# Patient Record
Sex: MALE | Race: WHITE
[De-identification: names, ages, dates, MRNs, and addresses within clinical notes are randomized per-mention and may not be internally consistent; named-entity substitution may affect disease eponyms.]

---

## 2022-09-14 ENCOUNTER — HOSPITAL ENCOUNTER (INPATIENT)
Dept: HOSPITAL 54 - MED | Age: 57
LOS: 2 days | Discharge: HOME | DRG: 279 | End: 2022-09-16
Attending: INTERNAL MEDICINE | Admitting: NURSE PRACTITIONER
Payer: COMMERCIAL

## 2022-09-14 VITALS — SYSTOLIC BLOOD PRESSURE: 146 MMHG | DIASTOLIC BLOOD PRESSURE: 86 MMHG

## 2022-09-14 VITALS — HEIGHT: 69 IN | BODY MASS INDEX: 26.51 KG/M2 | WEIGHT: 179 LBS

## 2022-09-14 DIAGNOSIS — K72.00: Primary | ICD-10-CM

## 2022-09-14 DIAGNOSIS — K71.3: ICD-10-CM

## 2022-09-14 DIAGNOSIS — Z86.73: ICD-10-CM

## 2022-09-14 DIAGNOSIS — D69.59: ICD-10-CM

## 2022-09-14 DIAGNOSIS — D64.9: ICD-10-CM

## 2022-09-14 DIAGNOSIS — K74.60: ICD-10-CM

## 2022-09-14 DIAGNOSIS — Z59.00: ICD-10-CM

## 2022-09-14 DIAGNOSIS — Z87.891: ICD-10-CM

## 2022-09-14 DIAGNOSIS — B19.20: ICD-10-CM

## 2022-09-14 DIAGNOSIS — F19.10: ICD-10-CM

## 2022-09-14 PROCEDURE — G0378 HOSPITAL OBSERVATION PER HR: HCPCS

## 2022-09-14 RX ADMIN — LACTULOSE SCH G: 10 SOLUTION ORAL at 23:48

## 2022-09-14 RX ADMIN — Medication SCH MG: at 23:48

## 2022-09-14 SDOH — ECONOMIC STABILITY - HOUSING INSECURITY: HOMELESSNESS UNSPECIFIED: Z59.00

## 2022-09-14 NOTE — NUR
MS RN ADMISSION NOTES:



RECEIVED PATIENT VIA RNEY DIRECT ADMIT TO [facility] AT 2130 AWAKE NO COMPLAIN OF 
PAIN AND DISCOMFORT NOTIFY DR KATELYNN MAGALLANES, PATIENT IS A/OX2-3 WITH EPISODE OF CONFUSION, IV 
LINE INSERTED AT RIGHT HAND #20SL. SKIN ASSESSMENT DONE DOCUMENTED, INVENTORY DONE, PATIENT 
WAS ORIENTED TO ROOM REMIND TO USE THE CALL LIGHTS WHEN NEEDED ASSISTANCE, PATIENT KEPT 
CLEAN AND DRY ALL NEEDS MET, WILL CONTINUE TO MONITOR.

## 2022-09-15 VITALS — DIASTOLIC BLOOD PRESSURE: 96 MMHG | SYSTOLIC BLOOD PRESSURE: 131 MMHG

## 2022-09-15 LAB
ALBUMIN SERPL BCP-MCNC: 2.9 G/DL (ref 3.4–5)
ALP SERPL-CCNC: 147 U/L (ref 46–116)
ALT SERPL W P-5'-P-CCNC: 32 U/L (ref 12–78)
AMMONIA PLAS-SCNC: 71 UMOL/L (ref 11–32)
AST SERPL W P-5'-P-CCNC: 55 U/L (ref 15–37)
BASOPHILS # BLD AUTO: 0.1 K/UL (ref 0–0.2)
BASOPHILS NFR BLD AUTO: 1.5 % (ref 0–2)
BASOPHILS NFR BLD MANUAL: 0 % (ref 0–2)
BILIRUB DIRECT SERPL-MCNC: 0.4 MG/DL (ref 0–0.2)
BILIRUB SERPL-MCNC: 1.7 MG/DL (ref 0.2–1)
BUN SERPL-MCNC: 15 MG/DL (ref 7–18)
CALCIUM SERPL-MCNC: 9.1 MG/DL (ref 8.5–10.1)
CHLORIDE SERPL-SCNC: 106 MMOL/L (ref 98–107)
CHOLEST SERPL-MCNC: 207 MG/DL (ref ?–200)
CO2 SERPL-SCNC: 22 MMOL/L (ref 21–32)
CREAT SERPL-MCNC: 0.9 MG/DL (ref 0.6–1.3)
EOSINOPHIL NFR BLD AUTO: 2 % (ref 0–6)
EOSINOPHIL NFR BLD MANUAL: 2 % (ref 0–4)
FERRITIN SERPL-MCNC: 18 NG/ML (ref 8–388)
GLUCOSE SERPL-MCNC: 90 MG/DL (ref 74–106)
HCT VFR BLD AUTO: 37 % (ref 39–51)
HDLC SERPL-MCNC: 66 MG/DL (ref 40–60)
HGB BLD-MCNC: 12.3 G/DL (ref 13.5–17.5)
IRON SERPL-MCNC: 215 UG/DL (ref 50–175)
LDLC SERPL DIRECT ASSAY-MCNC: 117 MG/DL (ref 0–99)
LYMPHOCYTES NFR BLD AUTO: 1.9 K/UL (ref 0.8–4.8)
LYMPHOCYTES NFR BLD AUTO: 44.6 % (ref 20–44)
LYMPHOCYTES NFR BLD MANUAL: 46 % (ref 16–48)
MAGNESIUM SERPL-MCNC: 2.1 MG/DL (ref 1.8–2.4)
MCHC RBC AUTO-ENTMCNC: 33 G/DL (ref 31–36)
MCV RBC AUTO: 86 FL (ref 80–96)
MONOCYTES NFR BLD AUTO: 0.4 K/UL (ref 0.1–1.3)
MONOCYTES NFR BLD AUTO: 10.1 % (ref 2–12)
MONOCYTES NFR BLD MANUAL: 13 % (ref 0–11)
NEUTROPHILS # BLD AUTO: 1.7 K/UL (ref 1.8–8.9)
NEUTROPHILS NFR BLD AUTO: 41.8 % (ref 43–81)
NEUTS SEG NFR BLD MANUAL: 39 % (ref 42–76)
PHOSPHATE SERPL-MCNC: 5.4 MG/DL (ref 2.5–4.9)
PLATELET # BLD AUTO: 62 K/UL (ref 150–450)
POTASSIUM SERPL-SCNC: 3.4 MMOL/L (ref 3.5–5.1)
PROT SERPL-MCNC: 8.2 G/DL (ref 6.4–8.2)
RBC # BLD AUTO: 4.32 MIL/UL (ref 4.5–6)
SODIUM SERPL-SCNC: 135 MMOL/L (ref 136–145)
TIBC SERPL-MCNC: 449 UG/DL (ref 250–450)
TRIGL SERPL-MCNC: 107 MG/DL (ref 30–150)
TSH SERPL DL<=0.005 MIU/L-ACNC: 1.69 UIU/ML (ref 0.36–3.74)
WBC NRBC COR # BLD AUTO: 4.2 K/UL (ref 4.3–11)

## 2022-09-15 RX ADMIN — LACTULOSE SCH G: 10 SOLUTION ORAL at 05:36

## 2022-09-15 RX ADMIN — LACTULOSE SCH G: 10 SOLUTION ORAL at 17:14

## 2022-09-15 RX ADMIN — Medication SCH MG: at 08:51

## 2022-09-15 RX ADMIN — LACTULOSE SCH G: 10 SOLUTION ORAL at 11:15

## 2022-09-15 RX ADMIN — LACTULOSE SCH G: 10 SOLUTION ORAL at 22:49

## 2022-09-15 RX ADMIN — PANTOPRAZOLE SODIUM SCH MG: 40 TABLET, DELAYED RELEASE ORAL at 07:41

## 2022-09-15 RX ADMIN — FOLIC ACID SCH MG: 1 TABLET ORAL at 08:51

## 2022-09-15 NOTE — NUR
MS RN CLOSING NOTES: 



PATIENT SLEEP IN BED COMFORTABLY, AROUSABLE TO VERBAL STIMULI, BED IN LOW POSITION CALL 
LIGHTS WITHIN REACH, NO COMPLAIN OF PAIN AND DISCOMFORT AT THIS TIME, ON ROOM AIR SATURATING 
WELL, PATIENT IS A/O X2-3, CONFUSED, AMBULATORY WITH ASSISTANCE, PATIENT KEPT CLEAN AND DRY 
ALL NEEDS MET WILL CONTINUE TO MONITOR.

## 2022-09-15 NOTE — NUR
MS RN CLOSING NOTES: 

 PATIENT AWAKE  IN BED COMFORTABLY. NO COMPLAIN OF PAIN AND DISCOMFORT AT THIS TIME, ON ROOM 
AIR SATURATING WELL, PATIENT IS A/O X2-3, WITH EPISODES OF CONFUSION. ALL DUE MEDS GIVEN AS 
ORDERED. ALL SAFETY MEASURES IN PLACE.BED IN LOW POSITION AND LOCKED.CALL LIGHTS AND TABLE  
WITHIN REACH, SIDE RAILS TIMES 2 .AMBULATORY WITH ASSISTANCE. WILL ENDORSE FOR YEE..

## 2022-09-15 NOTE — NUR
RN NOTES

GOT THE 0900 AM LIBRIUM 25 MG MEDICATION FROM "[facility]. SCANNED THE MEDICATION, WHEN WANTED 
TO SAVE THE RECORD IT GAVE ME AN ERROR WRITTEN IN PURPLE COLOR THAT  IT CAN NOT BE FILED. 
CALLED PHARMACY AND THEY STATED MEDICATION IS DC BY DR DUBOIS. RETURNED THE MEDICATION 
TO "[facility] AND YENNY JONES RN WAS THE WITNESS.

## 2022-09-15 NOTE — NUR
MS RN OPENING NOTE



RECEIVED PATIENT IN BED, AOX2-3, NO S/S OF APPARENT DISTRESS ON ROOM AIR. DENIES PAIN AT 
THIS TIME. RIGHT HAND IV ACCESS ON SALINE LOCK. RE-ORIENTED AND ENCOURAGED WITH THE USE OF 
CALL LIGHT. SAFETY IN PLACE. WILL CONTINUE WITH PATIENT'S PLAN OF CARE.

## 2022-09-15 NOTE — NUR
MS RN OPENING NOTES: 

RECEIVED PATIENT AWAKE  IN BED COMFORTABLY. NO COMPLAIN OF PAIN AND DISCOMFORT AT THIS TIME, 
ON ROOM AIR SATURATING WELL, PATIENT IS A/O X2-3, WITH EPISODES OF CONFUSION. ALL SAFETY 
MEASURES IN PLACE.BED IN LOW POSITION AND LOCKED.CALL LIGHTS AND TABLE  WITHIN REACH, SIDE 
RAILS TIMES 2 .AMBULATORY WITH ASSISTANCE. WILL CONTINUE TO MONITOR.

## 2022-09-16 VITALS — SYSTOLIC BLOOD PRESSURE: 131 MMHG | DIASTOLIC BLOOD PRESSURE: 96 MMHG

## 2022-09-16 VITALS — DIASTOLIC BLOOD PRESSURE: 82 MMHG | SYSTOLIC BLOOD PRESSURE: 149 MMHG

## 2022-09-16 LAB
BUN SERPL-MCNC: 15 MG/DL (ref 7–18)
CALCIUM SERPL-MCNC: 8.5 MG/DL (ref 8.5–10.1)
CHLORIDE SERPL-SCNC: 106 MMOL/L (ref 98–107)
CO2 SERPL-SCNC: 23 MMOL/L (ref 21–32)
CREAT SERPL-MCNC: 1 MG/DL (ref 0.6–1.3)
GLUCOSE SERPL-MCNC: 95 MG/DL (ref 74–106)
MAGNESIUM SERPL-MCNC: 1.9 MG/DL (ref 1.8–2.4)
PHOSPHATE SERPL-MCNC: 5 MG/DL (ref 2.5–4.9)
POTASSIUM SERPL-SCNC: 3.6 MMOL/L (ref 3.5–5.1)
SODIUM SERPL-SCNC: 139 MMOL/L (ref 136–145)

## 2022-09-16 RX ADMIN — LACTULOSE SCH G: 10 SOLUTION ORAL at 13:15

## 2022-09-16 RX ADMIN — PANTOPRAZOLE SODIUM SCH MG: 40 TABLET, DELAYED RELEASE ORAL at 09:24

## 2022-09-16 RX ADMIN — LACTULOSE SCH G: 10 SOLUTION ORAL at 05:45

## 2022-09-16 RX ADMIN — Medication SCH MG: at 09:24

## 2022-09-16 RX ADMIN — FOLIC ACID SCH MG: 1 TABLET ORAL at 09:24

## 2022-09-16 NOTE — NUR
ms rn

received on bed, awake,alert,oriented x3,not in any form of distress, respirations even and 
unlabored,no sob noted, lungs are clear,abdomen soft,positive bowel sounds, denies pain at 
this time,all needs attended.

## 2022-09-16 NOTE — NUR
ms rn

patient was  by facility staff, discharge instructions given and understood, to have 
a follow up w/ primary in one week, all needs attended, d/c w/ prescription,all needs 
attended.

## 2022-09-16 NOTE — NUR
ms rn

received on bed, awake,alert,oriented x3,not in any form of distress, respirations even and 
unlabored,no sob noted